# Patient Record
Sex: FEMALE | Race: WHITE
[De-identification: names, ages, dates, MRNs, and addresses within clinical notes are randomized per-mention and may not be internally consistent; named-entity substitution may affect disease eponyms.]

---

## 2018-01-23 ENCOUNTER — HOSPITAL ENCOUNTER (OUTPATIENT)
Dept: HOSPITAL 88 - OR | Age: 53
Discharge: HOME | End: 2018-01-23
Attending: SPECIALIST
Payer: COMMERCIAL
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
## 2018-01-23 DIAGNOSIS — S82.842A: Primary | ICD-10-CM

## 2018-01-23 DIAGNOSIS — I10: ICD-10-CM

## 2018-01-23 DIAGNOSIS — Z01.810: ICD-10-CM

## 2018-01-23 PROCEDURE — 76000 FLUOROSCOPY <1 HR PHYS/QHP: CPT

## 2018-01-23 PROCEDURE — 93005 ELECTROCARDIOGRAM TRACING: CPT

## 2018-01-23 PROCEDURE — 27792 TREATMENT OF ANKLE FRACTURE: CPT

## 2018-01-23 NOTE — OPERATIVE REPORT
DATE OF PROCEDURE:  January 23, 2018 

 

PREOPERATIVE DIAGNOSIS:  Displaced left bimalleolar equivalent ankle 

fracture.



POSTOPERATIVE DIAGNOSIS:  Displaced left bimalleolar equivalent ankle 

fracture.



OPERATION/PROCEDURE PERFORMED:  Closed reduction of the left ankle mortis 

and open reduction and internal fixation of a left fibular fracture.



ASSISTANT:   Patricia Washington PA-C



ANESTHESIA:  General endotracheal intubation anesthesia. 



IV FLUIDS:  Per the anesthesia record. 



BRIEF DESCRIPTION OF THE PATIENT'S OPERATIVE PROCEDURE:   Ms. Johnson was 

taken to the operating room and placed in supine position on the operating 

table.  Following induction of general anesthesia as well as endotracheal 

intubation, the patient's left lower extremity was examined under 

anesthesia.  She was found to have swelling and ecchymosis about the 

patient's left ankle joint.  The patient's lower extremity was prepped and 

draped in standard surgical fashion.  Case was begun by reducing the 

patient's ankle mortis.  An incision was then created over the fibula and 

this incision was carried through skin only.  Blunt dissection was used to 

deepen the incision to the level of the distal fibula.  The patient's 

fracture site was easily identified.  A curet was used to clean the 

fracture site.  The fracture was then held in its reduced position using a 

fracture reduction clamp.  A single screw was placed from anterior to 

posterior transfixing in this plane in its reduced position.  A 7 hole 

locking plate was then contoured to the lateral aspect of the fibula and 

affixed to the fibula with combinations of cortical and locking screws.  

Fluoroscopic evaluation of the ankle at this time demonstrated reduction of 

the patient's ankle mortis as well as reduction of patient's fibular 

fracture.  The hardware was in good position.  The wound was copiously 

irrigated.  It was closed in a multilayer fashion.  Sterile dressings were 

applied as well as a well-padded 3-sided splint.  

The patient was then awakened and taken to the post anesthesia care unit in 

stable condition. .









DD:  01/23/2018 16:33

DT:  01/23/2018 17:37

Job#:  [id number] PERNELL

## 2018-01-23 NOTE — OPERATIVE REPORT
DATE OF PROCEDURE:  January 23, 2018 

 

ADDENDUM:



Patricia Washington acted as first assistant for this case and was necessary for 

both prepping and draping the patient as well as the retraction of soft 

tissues that allowed this case to be successful.











DD:  01/23/2018 16:35

DT:  01/23/2018 17:32

Job#:  [id number]

## 2018-04-30 ENCOUNTER — HOSPITAL ENCOUNTER (OUTPATIENT)
Dept: HOSPITAL 88 - PT | Age: 53
End: 2018-04-30
Attending: SPECIALIST
Payer: COMMERCIAL

## 2018-04-30 DIAGNOSIS — S82.892A: ICD-10-CM

## 2018-04-30 DIAGNOSIS — M25.572: Primary | ICD-10-CM

## 2018-04-30 DIAGNOSIS — M62.81: ICD-10-CM

## 2018-04-30 DIAGNOSIS — M25.672: ICD-10-CM

## 2018-04-30 DIAGNOSIS — R26.2: ICD-10-CM

## 2018-04-30 PROCEDURE — 97535 SELF CARE MNGMENT TRAINING: CPT

## 2018-05-24 ENCOUNTER — HOSPITAL ENCOUNTER (OUTPATIENT)
Dept: HOSPITAL 88 - PT | Age: 53
LOS: 7 days | End: 2018-05-31
Attending: SPECIALIST
Payer: COMMERCIAL

## 2018-05-24 DIAGNOSIS — M62.81: ICD-10-CM

## 2018-05-24 DIAGNOSIS — M25.572: ICD-10-CM

## 2018-05-24 DIAGNOSIS — S82.892D: Primary | ICD-10-CM

## 2018-05-24 DIAGNOSIS — M25.672: ICD-10-CM

## 2018-05-24 DIAGNOSIS — R26.2: ICD-10-CM
